# Patient Record
Sex: MALE | URBAN - METROPOLITAN AREA
[De-identification: names, ages, dates, MRNs, and addresses within clinical notes are randomized per-mention and may not be internally consistent; named-entity substitution may affect disease eponyms.]

---

## 2020-12-08 ENCOUNTER — OUT OF OFFICE VISIT (OUTPATIENT)
Dept: URBAN - METROPOLITAN AREA MEDICAL CENTER 1 | Facility: MEDICAL CENTER | Age: 55
End: 2020-12-08
Payer: COMMERCIAL

## 2020-12-08 DIAGNOSIS — Z79.01 ANTICOAGULANT LONG-TERM USE: ICD-10-CM

## 2020-12-08 DIAGNOSIS — R10.84 ABDOMINAL CRAMPING, GENERALIZED: ICD-10-CM

## 2020-12-08 DIAGNOSIS — I48.91 A-FIB: ICD-10-CM

## 2020-12-08 PROCEDURE — 99232 SBSQ HOSP IP/OBS MODERATE 35: CPT | Performed by: INTERNAL MEDICINE

## 2020-12-08 PROCEDURE — 99254 IP/OBS CNSLTJ NEW/EST MOD 60: CPT | Performed by: INTERNAL MEDICINE

## 2020-12-16 ENCOUNTER — WEB ENCOUNTER (OUTPATIENT)
Dept: URBAN - NONMETROPOLITAN AREA CLINIC 2 | Facility: CLINIC | Age: 55
End: 2020-12-16

## 2020-12-16 ENCOUNTER — DASHBOARD ENCOUNTERS (OUTPATIENT)
Age: 55
End: 2020-12-16

## 2020-12-16 ENCOUNTER — OFFICE VISIT (OUTPATIENT)
Dept: URBAN - NONMETROPOLITAN AREA CLINIC 2 | Facility: CLINIC | Age: 55
End: 2020-12-16
Payer: COMMERCIAL

## 2020-12-16 DIAGNOSIS — K31.84 GASTROPARESIS: ICD-10-CM

## 2020-12-16 DIAGNOSIS — R10.13 EPIGASTRIC ABDOMINAL PAIN: ICD-10-CM

## 2020-12-16 DIAGNOSIS — K40.90 INGUINAL HERNIA: ICD-10-CM

## 2020-12-16 PROBLEM — 235675006 GASTROPARESIS: Status: ACTIVE | Noted: 2020-12-16

## 2020-12-16 PROCEDURE — G8482 FLU IMMUNIZE ORDER/ADMIN: HCPCS | Performed by: INTERNAL MEDICINE

## 2020-12-16 PROCEDURE — 99213 OFFICE O/P EST LOW 20 MIN: CPT | Performed by: INTERNAL MEDICINE

## 2020-12-16 PROCEDURE — G8420 CALC BMI NORM PARAMETERS: HCPCS | Performed by: INTERNAL MEDICINE

## 2020-12-16 RX ORDER — PANTOPRAZOLE SODIUM 40 MG/1
1 TABLET TABLET, DELAYED RELEASE ORAL BID
Qty: 60 | Refills: 6 | OUTPATIENT
Start: 2020-12-16

## 2020-12-16 NOTE — PHYSICAL EXAM GASTROINTESTINAL
Abdomen , soft, nontender, nondistended , no guarding or rigidity , no masses palpable , normal bowel sounds , Liver and Spleen , no hepatomegaly present  , liver nontender ,p Reducible right inguinal hernia

## 2020-12-16 NOTE — HPI-TODAY'S VISIT:
Mr. Ojeda is a very pleasant 55-year-old gentleman who is here for hospital follow-up.  He was admitted to [facility] over a week ago for abdominal pain.  He was seen by Dr. Arce in the inpatient team.  He did have a positive gastric emptying study that showed mild gastric emptying delay.  He was started on PPI.  He did not undergo endoscopy.  He returns today and states he is moderately better.  He does feel like the daily Protonix has helped but wonders if he needs more aggressive dose.  He denies any dysphagia.  He denies any nausea and vomiting.  He has had some inguinal pain and has a history of inguinal hernia repair approximately 5 to 6 years ago.  He does feel like he has recurrent hernia in his right inguinal area and would like to see someone for this.  He denies any fevers or significant pain in this area.

## 2020-12-17 ENCOUNTER — TELEPHONE ENCOUNTER (OUTPATIENT)
Dept: URBAN - METROPOLITAN AREA CLINIC 92 | Facility: CLINIC | Age: 55
End: 2020-12-17

## 2020-12-18 ENCOUNTER — OFFICE VISIT (OUTPATIENT)
Dept: URBAN - METROPOLITAN AREA CLINIC 13 | Facility: CLINIC | Age: 55
End: 2020-12-18

## 2020-12-18 PROBLEM — 42343007 CONGESTIVE HEART FAILURE: Status: ACTIVE | Noted: 2020-12-18

## 2020-12-18 PROBLEM — 39621005 DISORDER OF GALLBLADDER: Status: ACTIVE | Noted: 2020-12-18

## 2020-12-18 PROBLEM — 3723001 ARTHRITIS: Status: ACTIVE | Noted: 2020-12-18

## 2020-12-18 PROBLEM — 4556007 GASTRITIS: Status: ACTIVE | Noted: 2020-12-18

## 2020-12-18 PROBLEM — 49436004 ATRIAL FIBRILLATION: Status: ACTIVE | Noted: 2020-12-18

## 2020-12-18 PROBLEM — 398050005 DIVERTICULAR DISEASE OF COLON: Status: ACTIVE | Noted: 2020-12-18

## 2021-02-03 ENCOUNTER — OFFICE VISIT (OUTPATIENT)
Dept: URBAN - NONMETROPOLITAN AREA CLINIC 2 | Facility: CLINIC | Age: 56
End: 2021-02-03

## 2021-03-17 ENCOUNTER — OFFICE VISIT (OUTPATIENT)
Dept: URBAN - NONMETROPOLITAN AREA CLINIC 2 | Facility: CLINIC | Age: 56
End: 2021-03-17

## 2021-08-28 ENCOUNTER — TELEPHONE ENCOUNTER (OUTPATIENT)
Dept: URBAN - METROPOLITAN AREA CLINIC 13 | Facility: CLINIC | Age: 56
End: 2021-08-28

## 2021-08-29 ENCOUNTER — TELEPHONE ENCOUNTER (OUTPATIENT)
Dept: URBAN - METROPOLITAN AREA CLINIC 13 | Facility: CLINIC | Age: 56
End: 2021-08-29

## 2021-12-01 ENCOUNTER — ERX REFILL RESPONSE (OUTPATIENT)
Dept: URBAN - NONMETROPOLITAN AREA CLINIC 2 | Facility: CLINIC | Age: 56
End: 2021-12-01

## 2021-12-01 RX ORDER — PANTOPRAZOLE 40 MG/1
TAKE 1 TABLET BY MOUTH TWICE DAILY TABLET, DELAYED RELEASE ORAL
Qty: 60 TABLET | Refills: 1 | OUTPATIENT

## 2021-12-01 RX ORDER — PANTOPRAZOLE SODIUM 40 MG/1
1 TABLET TABLET, DELAYED RELEASE ORAL BID
Qty: 60 | Refills: 6 | OUTPATIENT